# Patient Record
Sex: FEMALE | Race: WHITE
[De-identification: names, ages, dates, MRNs, and addresses within clinical notes are randomized per-mention and may not be internally consistent; named-entity substitution may affect disease eponyms.]

---

## 2021-04-18 ENCOUNTER — HOSPITAL ENCOUNTER (EMERGENCY)
Dept: HOSPITAL 11 - JP.ED | Age: 71
Discharge: HOME | End: 2021-04-18
Payer: MEDICARE

## 2021-04-18 DIAGNOSIS — Z79.899: ICD-10-CM

## 2021-04-18 DIAGNOSIS — Z79.82: ICD-10-CM

## 2021-04-18 DIAGNOSIS — Z88.8: ICD-10-CM

## 2021-04-18 DIAGNOSIS — Z88.0: ICD-10-CM

## 2021-04-18 DIAGNOSIS — I10: Primary | ICD-10-CM

## 2021-04-18 DIAGNOSIS — E05.90: ICD-10-CM

## 2021-04-18 PROCEDURE — 99283 EMERGENCY DEPT VISIT LOW MDM: CPT

## 2021-04-18 NOTE — EDM.PDOC
ED HPI GENERAL MEDICAL PROBLEM





- General


Chief Complaint: Cardiovascular Problem


Stated Complaint: HEADACHE,HIGH BP, DIZZINESS


Time Seen by Provider: 04/18/21 18:55


Source of Information: Reports: Patient, Old Records, RN


History Limitations: Reports: No Limitations





- History of Present Illness


INITIAL COMMENTS - FREE TEXT/NARRATIVE: 





69 yo female presents with elevated BP possibly for a couple days. She had a 

change in her BP med about 2 weeks ago from an uncertain dose of lisinopril to 

Diltiazem  mg daily. Over the last 3 days she has noticed a mild HA and 

some dizziness. When she checked her BP at home today it was 

uncharacteristically elevated to about 200 systolic. Her provider is not 

available on the weekend and nurse direct told her to come to the ER. She has 

had dizziness like this about 10 times in her life, not necessarily correlated 

with elevated BP. 





She had an extensive blood work up the end of March '21 which she shared with me

the results on her phone. Kidney fxn was good. 


Onset: Gradual


Onset Date: 04/16/21


Duration: Day(s): (3), Getting Worse


Location: Reports: Head


Quality: Reports: Ache


Severity: Mild


Improves with: Reports: None


Worsens with: Reports: Other (? time)


Context: Reports: Other (See HPI)


Associated Symptoms: Reports: Other (mild intermittent vertigo)


Treatments PTA: Reports: Other (see below) (none)





- Related Data


                                    Allergies











Allergy/AdvReac Type Severity Reaction Status Date / Time


 


Penicillins Allergy  Cannot Verified 04/18/21 18:32





   Remember  


 


contac day-night cold-flu Allergy  Cannot Uncoded 04/18/21 18:32





   Remember  











Home Meds: 


                                    Home Meds





Calcium Carbonate/Vitamin D3 [Calcium-Vitamin D] 3 tab PO DAILY 12/09/13 

[History]


Fenofibrate,Micronized [Lofibra] 134 mg PO DAILY 12/09/13 [History]


Fish Oil/Omega-3 Fatty Acids [Fish Oil 1,000 MG] 3,000 mg PO DAILY 12/09/13 

[History]


Fluocinonide [Lidex 0.05% Crm] 1 applic TOP DAILY PRN 12/09/13 [History]


Levothyroxine [Synthroid] 100 mcg PO DAILY 12/09/13 [History]


Multivitamin with Folic Acid [Multiple Vitamin Tablet] 1 tab PO DAILY 12/09/13 

[History]


Vitamin E 1 tab PO DAILY 12/09/13 [History]


Aspirin 1 tab PO DAILY 04/18/21 [History]


Ibandronate Sodium [Boniva] 1 tab PO ASDIRECTED 04/18/21 [History]


Rosuvastatin [Crestor] 1 tab PO DAILY 04/18/21 [History]


dilTIAZem HCL [Dilt-XR] 1 tab PO DAILY 04/18/21 [History]











Past Medical History


Endocrine/Metabolic History: Reports: Hyperthyroidism





- Past Surgical History


Musculoskeletal Surgical History: Reports: Arthroscopic Knee





Social & Family History





- Tobacco Use


Tobacco Use Status *Q: Never Tobacco User





ED ROS GENERAL





- Review of Systems


Review Of Systems: See Below


Constitutional: Reports: No Symptoms


HEENT: Reports: No Symptoms


Respiratory: Reports: No Symptoms


Cardiovascular: Reports: No Symptoms


Endocrine: Reports: No Symptoms


GI/Abdominal: Reports: No Symptoms


: Reports: No Symptoms


Musculoskeletal: Reports: No Symptoms


Skin: Reports: No Symptoms


Neurological: Reports: Dizziness (mild, intermittent vertigo), Headache (mild)


Psychiatric: Reports: No Symptoms





ED EXAM, GENERAL





- Physical Exam


Exam: See Below


Exam Limited By: No Limitations


General Appearance: Alert, WD/WN, No Apparent Distress


Eye Exam: Bilateral Eye: Normal Inspection


Ears: Normal External Exam, Normal Canal, Hearing Grossly Normal, Normal TMs


Ear Exam: Bilateral Ear: Auricle Normal, Canal Normal, TM normal


Nose: Normal Inspection, No Blood


Throat/Mouth: Normal Inspection, Normal Lips, Normal Oropharynx, Normal Voice, 

No Airway Compromise


Head: Atraumatic, Normocephalic


Neck: Normal Inspection


Respiratory/Chest: No Respiratory Distress, Lungs Clear, Normal Breath Sounds, 

No Accessory Muscle Use


Cardiovascular: Regular Rate, Rhythm, No Edema


Extremities: Normal Inspection, No Pedal Edema.  No: Pedal Edema


Neurological: Alert, Oriented, CN II-XII Intact, Normal Cognition, No 

Motor/Sensory Deficits


Psychiatric: Normal Affect, Normal Mood


Skin Exam: Warm, Dry, Intact, Normal Color, No Rash





Course





- Vital Signs


Last Recorded V/S: 


                                Last Vital Signs











Temp  36.9 C   04/18/21 18:47


 


Pulse  75   04/18/21 20:24


 


Resp  16   04/18/21 18:47


 


BP  148/84 H  04/18/21 20:24


 


Pulse Ox  96   04/18/21 20:24














- Orders/Labs/Meds


Meds: 


Medications














Discontinued Medications














Generic Name Dose Route Start Last Admin





  Trade Name Grant  PRN Reason Stop Dose Admin


 


Clonidine HCl  0.1 mg  04/18/21 19:16  04/18/21 19:28





  Clonidine 0.1 Mg Tab  PO  04/18/21 19:17  0.1 mg





  ONETIME ONE   Administration


 


Diltiazem HCl  120 mg  04/18/21 19:15  04/18/21 19:29





  Diltiazem 120 Mg Cap.Cd  PO  04/18/21 19:16  120 mg





  ONETIME ONE   Administration


 


Meclizine HCl  25 mg  04/18/21 19:15  04/18/21 19:30





  Meclizine 25 Mg Tab  PO  04/18/21 19:16  25 mg





  ONETIME ONE   Administration














Departure





- Departure


Time of Disposition: 20:27


Disposition: Home, Self-Care 01


Condition: Good


Clinical Impression: 


 HTN, goal below 130/80





Referrals: 


PCP,None [Primary Care Provider] - 


Forms:  ED Department Discharge


Additional Instructions: 


Increase your diltiazem to one dose every 12 hrs. Use the meclizine as needed 

for vertigo symptoms. Discuss your situation with your provider tomorrow and 

keep her updated on your BP's. 





Sepsis Event Note (ED)





- Evaluation


Sepsis Screening Result: No Definite Risk





- Focused Exam


Vital Signs: 


                                   Vital Signs











  Temp Pulse Pulse Resp BP BP Pulse Ox


 


 04/18/21 20:24    75    148/84 H  96


 


 04/18/21 20:02    75    159/100 H  95


 


 04/18/21 19:29   82    170/97 H  


 


 04/18/21 19:28      170/97 H  


 


 04/18/21 19:22    74    170/97 H  99


 


 04/18/21 18:47  36.9 C   86  16   200/106 H  97


 


 04/18/21 18:46    75    198/106 H  97


 


 04/18/21 18:26  36.9 C   86  16   200/106 H  97